# Patient Record
Sex: MALE | Race: ASIAN | NOT HISPANIC OR LATINO | Employment: STUDENT | URBAN - METROPOLITAN AREA
[De-identification: names, ages, dates, MRNs, and addresses within clinical notes are randomized per-mention and may not be internally consistent; named-entity substitution may affect disease eponyms.]

---

## 2024-01-20 ENCOUNTER — HOSPITAL ENCOUNTER (EMERGENCY)
Facility: CLINIC | Age: 19
Discharge: HOME | End: 2024-01-20
Admitting: PHYSICIAN ASSISTANT
Payer: COMMERCIAL

## 2024-01-20 ENCOUNTER — APPOINTMENT (OUTPATIENT)
Dept: GENERAL RADIOLOGY | Facility: CLINIC | Age: 19
End: 2024-01-20
Attending: PHYSICIAN ASSISTANT
Payer: COMMERCIAL

## 2024-01-20 VITALS
TEMPERATURE: 97.6 F | HEART RATE: 69 BPM | HEIGHT: 72 IN | SYSTOLIC BLOOD PRESSURE: 131 MMHG | DIASTOLIC BLOOD PRESSURE: 79 MMHG | RESPIRATION RATE: 18 BRPM | BODY MASS INDEX: 20.31 KG/M2 | OXYGEN SATURATION: 99 % | WEIGHT: 149.91 LBS

## 2024-01-20 DIAGNOSIS — S93.491A SPRAIN OF OTHER LIGAMENT OF RIGHT ANKLE, INITIAL ENCOUNTER: ICD-10-CM

## 2024-01-20 DIAGNOSIS — S93.401A SPRAIN OF RIGHT ANKLE, UNSPECIFIED LIGAMENT, INITIAL ENCOUNTER: ICD-10-CM

## 2024-01-20 PROCEDURE — 73610 X-RAY EXAM OF ANKLE: CPT | Mod: RT

## 2024-01-20 PROCEDURE — 29515 APPLICATION SHORT LEG SPLINT: CPT | Mod: RT | Performed by: PHYSICIAN ASSISTANT

## 2024-01-20 PROCEDURE — 99283 EMERGENCY DEPT VISIT LOW MDM: CPT | Performed by: PHYSICIAN ASSISTANT

## 2024-01-20 PROCEDURE — 99284 EMERGENCY DEPT VISIT MOD MDM: CPT | Performed by: PHYSICIAN ASSISTANT

## 2024-01-20 PROCEDURE — 73610 X-RAY EXAM OF ANKLE: CPT | Mod: 26 | Performed by: RADIOLOGY

## 2024-01-20 ASSESSMENT — ACTIVITIES OF DAILY LIVING (ADL): ADLS_ACUITY_SCORE: 35

## 2024-01-20 NOTE — ED PROVIDER NOTES
[facility] ([facility])    1/20/24       ED PROVIDER NOTE   History     Chief Complaint   Patient presents with    Ankle Pain     HPI  Josephine Williamson is a 18 year old male who presents the emergency department tonight with concerns for right ankle injury.  Patient presents alone.  He states that yesterday he was playing basketball, and sustained an injury as he was jumping up in the air, landed on an inverted right ankle.  He subsequently landed on his bottom.  Initially he was able to bear weight.  Today now he states he is unable to put significant weight on his ankle.  He localizes pain to the medial and lateral aspects of the right ankle.  He denies any foot pain with this he denies any knee pain or other injury.  No numbness or tingling.  No history of ankle problems.  Has not taken any medication for pain and is not wanting any Tylenol or ibuprofen here in the emergency room.  He lives at a home with an elevator.  He is approximately 6 feet tall.    Past Medical History  History reviewed. No pertinent past medical history.  History reviewed. No pertinent surgical history.  No current outpatient medications on file.    No Known Allergies  Family History  History reviewed. No pertinent family history.  Social History   Social History     Tobacco Use    Smoking status: Never    Smokeless tobacco: Never         A medically appropriate review of systems was performed with pertinent positives and negatives noted in the HPI, and all other systems negative.    Physical Exam   BP: 131/79  Pulse: 69  Temp: 97.6  F (36.4  C)  Resp: 18  Height: 182.9 cm (6')  Weight: 68 kg (149 lb 14.6 oz)  SpO2: 99 %  Physical Exam      GENERAL APPEARANCE: The patient is well developed, well appearing, and in no acute distress.  HEAD:  Normocephalic and atraumatic.   EENT: Voice normal.  NECK: Trachea is midline.  EXTREMITIES: Right ankle without obvious effusion or deformity.  Patient is able to move digits 1  through 5 of the right foot without difficulty.  Is able to somewhat plantarflex dorsiflex with limited range of motion due to pain.  Palpation elicits focal tenderness to palpation over both the anterior/posterior aspect of the medial malleolus with some lateral malleoli or tenderness as well.  Peripheral vascular: PT pulse 2+ on the right.  Cap refill less than 2 seconds digits 1 through 5 left foot.  NEUROLOGIC: No focal sensory or motor deficits are noted.  Gross sensation intact distal aspect digits 1 through 5 left foot  PSYCHIATRIC: The patient is awake, alert.  Appropriate mood and affect.  SKIN: Warm, dry, and well perfused. Good turgor.    ED Course, Procedures, & Data         Results for orders placed or performed during the hospital encounter of 01/20/24   Ankle XR, G/E 3 views, right     Status: None    Narrative    EXAM: XR ANKLE RIGHT G/E 3 VIEWS  LOCATION: [facility]  DATE: 1/20/2024    INDICATION: Pain, trauma evaluate fracture  COMPARISON: None.      Impression    IMPRESSION: Normal joint spaces and alignment. No fracture. Lateral ankle soft tissue swelling.     Medications - No data to display  Labs Ordered and Resulted from Time of ED Arrival to Time of ED Departure - No data to display  Ankle XR, G/E 3 views, right   Final Result   IMPRESSION: Normal joint spaces and alignment. No fracture. Lateral ankle soft tissue swelling.             Critical care was not performed.     Medical Decision Making  The patient's presentation was of low complexity (an acute and uncomplicated illness or injury).    The patient's evaluation involved:  ordering and/or review of 1 test(s) in this encounter (see separate area of note for details)  independent interpretation of testing performed by another health professional (x-ray)    The patient's management necessitated only low risk treatment.    Assessment & Plan    This is an otherwise healthy 18-year-old male  present with concerns for isolated right ankle injury sustained yesterday.  Presentation vital signs within normal limits.  Exam of the right ankle shows no obvious effusion, patient has somewhat intact range of motion does have intact PT pulse.  He has tenderness to palpation over both the medial and lateral malleolus without otherwise open skin or deformity.  Differential considered includes possibility of fracture or sprain, patient is not having any other tenderness or other findings suggestive of other injury.  Radiograph was obtained, independently interpreted by myself I do not see any obvious fracture.  Radiologist overread negative.  This was discussed with the patient.  Symptoms most consistent with ankle sprain at this time.  I offered patient crutches, he declines.  He was agreeable to stirrup splint and was given this here in the emergency department.  We discussed the importance of ice elevation Tylenol ibuprofen and gradual return to activity as tolerated.  He was given a school note as he is currently in physical education.  We discussed reasons to return back to the emergency department.  Patient has no other questions or concerns at this time.  Red flag signs were addressed, and they were in agreement with the patient care plan provided.    I have reviewed the nursing notes. I have reviewed the findings, diagnosis, plan and need for follow up with the patient.    There are no discharge medications for this patient.      Final diagnoses:   Sprain of right ankle, unspecified ligament, initial encounter       AURY Giles [facility] EMERGENCY DEPARTMENT  1/20/2024

## 2024-01-20 NOTE — Clinical Note
Josephine Williamson was seen and treated in our emergency department on 1/20/2024.may return to gym class or sports with limited activity until 02/03/2024.  Patient may return to gym class however he has weightbearing as tolerated for the next 2 weeks which may significantly limit his activity until 2/3/2024.    If you have any questions or concerns, please don't hesitate to call.      Lucy Sen PA-C

## 2024-01-20 NOTE — ED TRIAGE NOTES
Pt presents after injuring right ankle yesterday afternoon playing basketball.  Pt states that today the pain has increased and he has noticed an increase in swelling.  CMS intact.     Triage Assessment (Adult)       Row Name 01/20/24 3656          Triage Assessment    Airway WDL WDL        Respiratory WDL    Respiratory WDL WDL        Skin Circulation/Temperature WDL    Skin Circulation/Temperature WDL WDL        Cardiac WDL    Cardiac WDL WDL        Peripheral/Neurovascular WDL    Peripheral Neurovascular WDL WDL

## 2024-01-20 NOTE — DISCHARGE INSTRUCTIONS
Here in the emergency room you have reassuring evaluation.  Your x-ray was negative for fracture.  We discussed your symptoms are consistent with an ankle sprain.  I offered you crutches and you would like to hold off with these.  You are given end ankle stirrup splint which we discussed using as needed for the neck several days.  Typically ankle sprains resolve within 1 to 2 weeks with elevation ice Tylenol ibuprofen and gradual return to activity as tolerated.  School note given.  If you are not feeling better in the next 2 weeks recommend following up with primary care for recheck.    If you develop any new or worsening symptoms, is important to return right away to the emergency department for further evaluation and management.